# Patient Record
Sex: FEMALE | Race: OTHER | HISPANIC OR LATINO | URBAN - METROPOLITAN AREA
[De-identification: names, ages, dates, MRNs, and addresses within clinical notes are randomized per-mention and may not be internally consistent; named-entity substitution may affect disease eponyms.]

---

## 2024-11-01 ENCOUNTER — HOSPITAL ENCOUNTER (EMERGENCY)
Facility: HOSPITAL | Age: 24
Discharge: HOME | End: 2024-11-01
Attending: EMERGENCY MEDICINE
Payer: COMMERCIAL

## 2024-11-01 VITALS
WEIGHT: 135.38 LBS | DIASTOLIC BLOOD PRESSURE: 68 MMHG | BODY MASS INDEX: 26.58 KG/M2 | OXYGEN SATURATION: 100 % | TEMPERATURE: 98 F | RESPIRATION RATE: 16 BRPM | HEIGHT: 60 IN | SYSTOLIC BLOOD PRESSURE: 110 MMHG | HEART RATE: 86 BPM

## 2024-11-01 DIAGNOSIS — O20.9 VAGINAL BLEEDING AFFECTING EARLY PREGNANCY: Primary | ICD-10-CM

## 2024-11-01 LAB
ABO + RH BLD: NORMAL
ALBUMIN SERPL-MCNC: 4.3 G/DL (ref 3.3–5.5)
ALP SERPL-CCNC: 44 U/L (ref 42–141)
B-HCG UR QL: POSITIVE
BILIRUB SERPL-MCNC: 0.6 MG/DL (ref 0.2–1.6)
BILIRUBIN, POC UA: NEGATIVE
BLOOD, POC UA: ABNORMAL
BUN SERPL-MCNC: 12 MG/DL (ref 7–22)
CALCIUM SERPL-MCNC: 10.2 MG/DL (ref 8–10.3)
CHLORIDE SERPL-SCNC: 103 MMOL/L (ref 98–108)
CLARITY, UA: CLEAR
COLOR, UA: YELLOW
CREAT SERPL-MCNC: 0.5 MG/DL (ref 0.6–1.2)
CTP QC/QA: YES
GLUCOSE SERPL-MCNC: 85 MG/DL (ref 73–118)
GLUCOSE, POC UA: NEGATIVE
HCG INTACT+B SERPL-ACNC: 17 MIU/ML
HCT, POC: NORMAL
HGB, POC: NORMAL (ref 14–18)
KETONES, POC UA: ABNORMAL
LEUKOCYTE EST, POC UA: ABNORMAL
MCH, POC: NORMAL
MCHC, POC: NORMAL
MCV, POC: NORMAL
MPV, POC: NORMAL
NITRITE, POC UA: NEGATIVE
PH UR STRIP: 6 [PH] (ref 5–8)
POC ALT (SGPT): 21 U/L (ref 10–47)
POC AST (SGOT): 28 U/L (ref 11–38)
POC PLATELET COUNT: NORMAL
POC TCO2: 24 MMOL/L (ref 18–33)
POTASSIUM BLD-SCNC: 4.4 MMOL/L (ref 3.6–5.1)
PROTEIN, POC UA: ABNORMAL
PROTEIN, POC: 8.1 G/DL (ref 6.4–8.1)
RBC, POC: NORMAL
RDW, POC: NORMAL
SODIUM BLD-SCNC: 140 MMOL/L (ref 128–145)
SPECIFIC GRAVITY, POC UA: 1.02 (ref 1–1.03)
UROBILINOGEN, POC UA: 0.2 E.U./DL
WBC, POC: NORMAL

## 2024-11-01 PROCEDURE — 84702 CHORIONIC GONADOTROPIN TEST: CPT | Performed by: EMERGENCY MEDICINE

## 2024-11-01 PROCEDURE — 86900 BLOOD TYPING SEROLOGIC ABO: CPT | Performed by: EMERGENCY MEDICINE

## 2024-11-01 PROCEDURE — 81025 URINE PREGNANCY TEST: CPT | Mod: ER

## 2024-11-01 PROCEDURE — 81003 URINALYSIS AUTO W/O SCOPE: CPT | Mod: ER

## 2024-11-01 PROCEDURE — 80053 COMPREHEN METABOLIC PANEL: CPT | Mod: ER

## 2024-11-01 PROCEDURE — 99284 EMERGENCY DEPT VISIT MOD MDM: CPT | Mod: 25,ER

## 2024-11-01 PROCEDURE — 85025 COMPLETE CBC W/AUTO DIFF WBC: CPT | Mod: ER

## 2024-11-01 NOTE — DISCHARGE INSTRUCTIONS
Your pregnancy test is positive but your blood pregnancy hormones levels are very low. Nothing was seen on the ultrasound, It is very important that you go to the ER on Monday for repeat testing to see if you have a normal pregnancy or if you are having a miscarriage.

## 2024-11-01 NOTE — ED PROVIDER NOTES
Encounter Date: 11/1/2024    SCRIBE #1 NOTE: I, Philly Alvarenga, am scribing for, and in the presence of,  Ayde Ta MD. I have scribed the following portions of the note - Other sections scribed: HPI, ROS, PE.       History     Chief Complaint   Patient presents with    Vaginal Bleeding     Pt states vaginal bleeding and cramping x 2 days. Pt states she is pregnant per home test      24 year old female G1, with no PMHx, presents to the ED for evaluation of vaginal bleeding, symptoms began last night. Reports associated suprapubic tenderness. States three days ago she had a positive at home pregnancy test and is unsure how far along she is; her last menstrual period was 9/12/24. States she did not have sexual intercourse last night and has been wearing regular pads since last night. Not soaking them. States her first pregnancy was not in this country and that she does not have a local OB-GYN. No other alleviating or exacerbating factors. Denies any complaints at this time.  This is the extent of the patient's complaints in the ED.     The history is provided by the patient. The history is limited by a language barrier. A  was used ([id number]).     Review of patient's allergies indicates:   Allergen Reactions    Latex, natural rubber      History reviewed. No pertinent past medical history.  History reviewed. No pertinent surgical history.  No family history on file.  Social History     Tobacco Use    Smoking status: Unknown     Review of Systems   Constitutional:  Negative for activity change, appetite change, chills and fever.   HENT:  Negative for congestion, rhinorrhea, sneezing and sore throat.    Respiratory:  Negative for cough, choking, shortness of breath and wheezing.    Cardiovascular:  Negative for chest pain and palpitations.   Gastrointestinal:  Positive for abdominal pain (suprapubic). Negative for diarrhea, nausea and vomiting.   Genitourinary:  Positive for vaginal bleeding.    Neurological:  Negative for dizziness, syncope, light-headedness and headaches.   All other systems reviewed and are negative.      Physical Exam     Initial Vitals [11/01/24 1149]   BP Pulse Resp Temp SpO2   129/85 94 17 97.8 °F (36.6 °C) 100 %      MAP       --         Physical Exam    Nursing note and vitals reviewed.  Constitutional: She appears well-developed and well-nourished. No distress.   HENT:   Head: Normocephalic and atraumatic.   Eyes: Conjunctivae are normal.   Neck:   Normal range of motion.  Cardiovascular:  Normal rate, regular rhythm and normal heart sounds.           No murmur heard.  Pulmonary/Chest: Breath sounds normal. No respiratory distress.   Abdominal: Bowel sounds are normal. She exhibits no distension. There is abdominal tenderness in the suprapubic area.   Musculoskeletal:         General: Normal range of motion.      Cervical back: Normal range of motion.     Neurological: She is alert and oriented to person, place, and time.   Skin: Skin is warm and dry.   Psychiatric: She has a normal mood and affect. Her behavior is normal.         ED Course   Procedures  Labs Reviewed   POCT URINE PREGNANCY - Abnormal       Result Value    POC Preg Test, Ur Positive (*)      Acceptable Yes     POCT URINALYSIS W/O SCOPE - Abnormal    Glucose, UA Negative      Bilirubin, UA Negative      Ketones, UA 2+ (*)     Spec Grav UA 1.020      Blood, UA 3+ (*)     PH, UA 6.0      Protein, UA Trace (*)     Urobilinogen, UA 0.2      Nitrite, UA Negative      Leukocytes, UA 1+ (*)     Color, UA POC Yellow      Clarity, UA, POC Clear     POCT CMP - Abnormal    Albumin, POC 4.3      Alkaline Phosphatase, POC 44      ALT (SGPT), POC 21      AST (SGOT), POC 28      POC BUN 12      Calcium, POC 10.2      POC Chloride 103      POC Creatinine 0.5 (*)     POC Glucose 85      POC Potassium 4.4      POC Sodium 140      Bilirubin, POC 0.6      POC TCO2 24      Protein, POC 8.1     HCG, QUANTITATIVE    HCG  Quant 17      Narrative:     Release to patient->Immediate   POCT URINALYSIS W/O SCOPE   POCT CBC    Hematocrit        Hemoglobin        RBC        WBC        MCV        MCH, POC        MCHC        RDW-CV        Platelet Count, POC        MPV       GROUP & RH    Group & Rh A POS     POCT CMP          Imaging Results               US OB <14 Wks TransAbd & TransVag, Single Gestation (XPD) (Final result)  Result time 11/01/24 16:19:56      Final result by Isidoro Olivera MD (11/01/24 16:19:56)                   Impression:      No intrauterine pregnancy is detected.    The findings are nonspecific and may be associated with early imaging. Miscarriage or ectopic pregnancy cannot be excluded. Recommend OB gyn follow-up, follow-up transabdominal and transvaginal pelvic ultrasound and serial beta HCG.    This report was flagged in Epic as abnormal.      Electronically signed by: Isidoro Olivera  Date:    11/01/2024  Time:    16:19               Narrative:    EXAMINATION:  US OB <14 WEEKS, TRANSABDOM & TRANSVAG, SINGLE GESTATION (XPD)    CLINICAL HISTORY:  Vag Bleeding;    TECHNIQUE:  Transabdominal sonography of the pelvis was performed, followed by transvaginal sonography to better evaluate the uterus and ovaries.    COMPARISON:  None.    FINDINGS:  No intrauterine pregnancy identified.    Uterus measures 6.1 x 3.6 x 4.4 cm.    Endometrial stripe measures 1.2 cm.    Right ovary measures 1.3 x 2.2 x 1.2 cm.  Left ovary measures 2.42.6 x 1.5 cm.    Bilateral color and Doppler flow to the ovaries.    No free fluid in the cul-de-sac.    Cervix is closed measuring 2.7 cm.    The findings are nonspecific and may be associated with early imaging.  Miscarriage or ectopic pregnancy cannot be excluded.  Recommend OB gyn follow-up, follow-up transabdominal and transvaginal pelvic ultrasound and serial beta HCG.                                       Medications - No data to display  Medical Decision Making  24 year old female G1,  with no PMHx, presents to the ED for evaluation of vaginal bleeding, symptoms began last night. Reports associated suprapubic tenderness. States three day ago she had a positive at home pregnancy test and is unsure how far along she is; her last menstrual period was 9/12/24. States she did not have sexual intercourse last night and has been wearing regular pads since last night. States her first pregnancy was not in this country and that she does not have an OB-GYN. No other alleviating or exacerbating factors. Denies any complaints at this time.  This is the extent of the patient's complaints in the ED.    On exam, there is suprapubic tenderness.     In shared decision making with the patient I will order labs and imaging. Beat hcg is 17. No IUP seen on ultrasound. Differential includes normal early pregnancy vs miscarriage. Pt was advised to return to ER on Monday for repeat labs and possible ultrasound. She understands plan.     Amount and/or Complexity of Data Reviewed  Labs: ordered. Decision-making details documented in ED Course.  Radiology: ordered. Decision-making details documented in ED Course.            Scribe Attestation:   Scribe #1: I performed the above scribed service and the documentation accurately describes the services I performed. I attest to the accuracy of the note.                               Clinical Impression:  Final diagnoses:  [O20.9] Vaginal bleeding affecting early pregnancy (Primary)          ED Disposition Condition    Discharge Stable          I, Dr. Ayde Ta, personally performed the services described in this documentation.   All medical record entries made by the scribe were at my direction and in my presence.   I have reviewed the chart and agree that the record is accurate and complete.   Ayde Ta MD.  1:53 PM 11/01/2024   ED Prescriptions    None       Follow-up Information       Follow up With Specialties Details Why Contact Brando Pichardo, [facility]     [address]  [place] [address]  [phone]      [facility]   [address] [facility] [address]  [phone]             Ayde Ta MD  11/01/24 0322

## 2024-11-04 ENCOUNTER — HOSPITAL ENCOUNTER (EMERGENCY)
Facility: HOSPITAL | Age: 24
Discharge: HOME | End: 2024-11-04
Attending: EMERGENCY MEDICINE
Payer: COMMERCIAL

## 2024-11-04 VITALS
BODY MASS INDEX: 25.91 KG/M2 | HEIGHT: 60 IN | OXYGEN SATURATION: 99 % | RESPIRATION RATE: 18 BRPM | DIASTOLIC BLOOD PRESSURE: 82 MMHG | WEIGHT: 132 LBS | SYSTOLIC BLOOD PRESSURE: 125 MMHG | HEART RATE: 78 BPM | TEMPERATURE: 98 F

## 2024-11-04 DIAGNOSIS — N93.9 VAGINAL BLEEDING: Primary | ICD-10-CM

## 2024-11-04 DIAGNOSIS — Z34.90 PREGNANCY, UNSPECIFIED GESTATIONAL AGE: ICD-10-CM

## 2024-11-04 LAB
ABO GROUP BLD: NORMAL
ALBUMIN SERPL BCP-MCNC: 4.1 G/DL (ref 3.5–5.2)
ALP SERPL-CCNC: 46 U/L (ref 40–150)
ALT SERPL W/O P-5'-P-CCNC: 21 U/L (ref 10–44)
ANION GAP SERPL CALC-SCNC: 9 MMOL/L (ref 8–16)
AST SERPL-CCNC: 22 U/L (ref 10–40)
B-HCG UR QL: POSITIVE
BASOPHILS # BLD AUTO: 0.03 K/UL (ref 0–0.2)
BASOPHILS NFR BLD: 0.3 % (ref 0–1.9)
BILIRUB SERPL-MCNC: 0.2 MG/DL (ref 0.1–1)
BUN SERPL-MCNC: 9 MG/DL (ref 6–20)
CALCIUM SERPL-MCNC: 9.9 MG/DL (ref 8.7–10.5)
CHLORIDE SERPL-SCNC: 112 MMOL/L (ref 95–110)
CO2 SERPL-SCNC: 22 MMOL/L (ref 23–29)
CREAT SERPL-MCNC: 0.7 MG/DL (ref 0.5–1.4)
CTP QC/QA: YES
DIFFERENTIAL METHOD BLD: NORMAL
EOSINOPHIL # BLD AUTO: 0.1 K/UL (ref 0–0.5)
EOSINOPHIL NFR BLD: 1 % (ref 0–8)
ERYTHROCYTE [DISTWIDTH] IN BLOOD BY AUTOMATED COUNT: 12.6 % (ref 11.5–14.5)
EST. GFR  (NO RACE VARIABLE): >60 ML/MIN/1.73 M^2
GLUCOSE SERPL-MCNC: 87 MG/DL (ref 70–110)
HCG INTACT+B SERPL-ACNC: 96 MIU/ML
HCT VFR BLD AUTO: 41.6 % (ref 37–48.5)
HGB BLD-MCNC: 13.7 G/DL (ref 12–16)
IMM GRANULOCYTES # BLD AUTO: 0.03 K/UL (ref 0–0.04)
IMM GRANULOCYTES NFR BLD AUTO: 0.3 % (ref 0–0.5)
LYMPHOCYTES # BLD AUTO: 2.7 K/UL (ref 1–4.8)
LYMPHOCYTES NFR BLD: 30.5 % (ref 18–48)
MCH RBC QN AUTO: 29.5 PG (ref 27–31)
MCHC RBC AUTO-ENTMCNC: 32.9 G/DL (ref 32–36)
MCV RBC AUTO: 90 FL (ref 82–98)
MONOCYTES # BLD AUTO: 0.6 K/UL (ref 0.3–1)
MONOCYTES NFR BLD: 6.8 % (ref 4–15)
NEUTROPHILS # BLD AUTO: 5.4 K/UL (ref 1.8–7.7)
NEUTROPHILS NFR BLD: 61.1 % (ref 38–73)
NRBC BLD-RTO: 0 /100 WBC
PLATELET # BLD AUTO: 359 K/UL (ref 150–450)
PMV BLD AUTO: 9.5 FL (ref 9.2–12.9)
POTASSIUM SERPL-SCNC: 3.8 MMOL/L (ref 3.5–5.1)
PROT SERPL-MCNC: 8.1 G/DL (ref 6–8.4)
RBC # BLD AUTO: 4.65 M/UL (ref 4–5.4)
RH BLD: NORMAL
SODIUM SERPL-SCNC: 143 MMOL/L (ref 136–145)
WBC # BLD AUTO: 8.88 K/UL (ref 3.9–12.7)

## 2024-11-04 PROCEDURE — 80053 COMPREHEN METABOLIC PANEL: CPT

## 2024-11-04 PROCEDURE — 86900 BLOOD TYPING SEROLOGIC ABO: CPT

## 2024-11-04 PROCEDURE — 99284 EMERGENCY DEPT VISIT MOD MDM: CPT | Mod: 25

## 2024-11-04 PROCEDURE — 85025 COMPLETE CBC W/AUTO DIFF WBC: CPT

## 2024-11-04 PROCEDURE — 81025 URINE PREGNANCY TEST: CPT

## 2024-11-04 PROCEDURE — 84702 CHORIONIC GONADOTROPIN TEST: CPT

## 2024-11-04 RX ORDER — FAMOTIDINE 20 MG
1 TABLET ORAL DAILY
Qty: 30 TABLET | Refills: 0 | Status: SHIPPED | OUTPATIENT
Start: 2024-11-04 | End: 2025-11-04

## 2024-11-04 NOTE — ED PROVIDER NOTES
Encounter Date: 11/4/2024       History     Chief Complaint   Patient presents with    Vaginal Bleeding     Patient reports vag bleeding since Thursday of last week. Has not established care with OB yet. Has US last week showing no IUP. Reports mild to moderate bleeding with small clots.     Abdominal Cramping     Female with no PMHx presents to emergency department for evaluation of vaginal bleeding x5 days.  She has previously seen at [facility] for same complaint.  Reports she did have a home pregnancy test that was positive prior to the [place] ER visit.  Reports her last menstrual period was 9/12/24.  She reports the bleeding is intermittent and is only really noticeable when she is going to the bathroom.  She states she is not bleeding through a full pad.  She has not established with an OBGYN.  She had blood work as well as a transvaginal ultrasound while at the ER and was instructed to return to emergency department on Monday for repeat labs and possible ultrasound.     was used #[id number]        Review of patient's allergies indicates:   Allergen Reactions    Latex, natural rubber      History reviewed. No pertinent past medical history.  History reviewed. No pertinent surgical history.  No family history on file.  Social History     Tobacco Use    Smoking status: Unknown     Review of Systems   Constitutional:  Negative for chills and fever.   HENT:  Negative for sore throat.    Respiratory:  Negative for shortness of breath.    Cardiovascular:  Negative for chest pain.   Gastrointestinal:  Positive for abdominal pain (Suprapubic) and nausea. Negative for diarrhea and vomiting.   Genitourinary:  Positive for vaginal bleeding. Negative for dysuria, flank pain, frequency, vaginal discharge and vaginal pain.   Musculoskeletal:  Negative for back pain.   Skin:  Negative for rash.   Neurological:  Negative for dizziness, syncope, weakness and light-headedness.   Hematological:  Does not  bruise/bleed easily.       Physical Exam     Initial Vitals [11/04/24 1024]   BP Pulse Resp Temp SpO2   122/79 76 18 98.1 °F (36.7 °C) 99 %      MAP       --         Physical Exam    Vitals reviewed.  Constitutional: She appears well-developed and well-nourished. No distress.   Neck: Neck supple.   Normal range of motion.  Cardiovascular:  Normal rate and regular rhythm.     Exam reveals no friction rub.       No murmur heard.  Pulmonary/Chest: Breath sounds normal. No respiratory distress. She has no wheezes.   Abdominal: Abdomen is soft and flat. Bowel sounds are normal. She exhibits no distension. There is abdominal tenderness in the suprapubic area.   No right CVA tenderness.  No left CVA tenderness. There is no rebound, no guarding, no tenderness at McBurney's point and negative Blair's sign. negative Rovsing's sign  Genitourinary:    Vagina normal.      Pelvic exam was performed with patient prone.   Cervix exhibits no friability.    No vaginal discharge or tenderness.   No tenderness in the vagina.    No signs of injury in the vagina.      Genitourinary Comments: Chaperone, [place] Carroll, RN, was present at time of pelvic exam    On vaginal exam old brown blood in in vaginal canal.  Cervical os closed.  No cervical friability.     Musculoskeletal:      Cervical back: Normal range of motion and neck supple.     Neurological: She is alert and oriented to person, place, and time.   Skin: Skin is warm.         ED Course   Procedures  Labs Reviewed   COMPREHENSIVE METABOLIC PANEL - Abnormal       Result Value    Sodium 143      Potassium 3.8      Chloride 112 (*)     CO2 22 (*)     Glucose 87      BUN 9      Creatinine 0.7      Calcium 9.9      Total Protein 8.1      Albumin 4.1      Total Bilirubin 0.2      Alkaline Phosphatase 46      AST 22      ALT 21      eGFR >60      Anion Gap 9      Narrative:     Release to patient->Immediate   POCT URINE PREGNANCY - Abnormal    POC Preg Test, Ur Positive (*)       Acceptable Yes     CBC W/ AUTO DIFFERENTIAL    WBC 8.88      RBC 4.65      Hemoglobin 13.7      Hematocrit 41.6      MCV 90      MCH 29.5      MCHC 32.9      RDW 12.6      Platelets 359      MPV 9.5      Immature Granulocytes 0.3      Gran # (ANC) 5.4      Immature Grans (Abs) 0.03      Lymph # 2.7      Mono # 0.6      Eos # 0.1      Baso # 0.03      nRBC 0      Gran % 61.1      Lymph % 30.5      Mono % 6.8      Eosinophil % 1.0      Basophil % 0.3      Differential Method Automated      Narrative:     Release to patient->Immediate   HCG, QUANTITATIVE    HCG Quant 96      Narrative:     Release to patient->Immediate   GROUP & RH    ABO A      Rh Type POS            Imaging Results              US OB <14 Wks TransAbd & TransVag, Single Gestation (XPD) (Final result)  Result time 11/04/24 12:42:52      Final result by Nomi Reynoso MD (11/04/24 12:42:52)                   Impression:      No IUP or extra uterine gestational sac seen consistent with pregnancy of unknown location.  Follow-up with serial beta HCG and/or pelvic ultrasound recommended to diagnosis confirmation.      Electronically signed by: Nomi Reynoso MD  Date:    11/04/2024  Time:    12:42               Narrative:    EXAMINATION:  US OB <14 WEEKS, TRANSABDOM & TRANSVAG, SINGLE GESTATION (XPD)    CLINICAL HISTORY:  Vag Bleeding;    TECHNIQUE:  Transabdominal sonography of the pelvis was performed, followed by transvaginal sonography to better evaluate the uterus and ovaries.    COMPARISON:  Pelvic ultrasound 11/01/2024    FINDINGS:  Uterus: 7.5 x 4.1 x 6.1 cm.  No discrete fibroid.  Endometrium measures 9 mm in thickness.    No IUP or extra uterine gestational sac seen.    Right ovary: 1.6 x 1.4 x 1.9 cm with intact arterial and venous flow    Left ovary: 2.2 x 1.5 x 2.7 cm with intact arterial and venous flow containing a 1.5 cm probable corpus luteum which appears new from 11/01/2024 pelvic ultrasound    Miscellaneous: No Free  Fluid.                                       Medications - No data to display  Medical Decision Making  [age] old female no PMHx presents to emergency department with vaginal bleeding abdominal pain.    Differential diagnosis includes but not limited to implantation pregnancy bleeding, ectopic pregnancy, missed , threatened , septic  and others     CBC and CMP were within normal limits, beta hCG was 96, previous beta hCG on  was 17.  Repeated transvaginal ultrasound which showed no IUP or extra uterine gestational sac.  This is similar to her previous ultrasound.  Results could be due to earliness of pregnancy.  On vaginal exam chaperone was present, [place] BLACKMON.  Vaginal canal was normal appearing.  There was a very small amount old brown blood present on exam.  Cervical os was closed.  Bleeding could likely be due to implantation.  Due to the increasing beta HCG feels less likely we are having a missed, threatened, or septic .  Septic  is also on differential due to patient not having any fever like symptoms.  Instructed patient to follow up with OBGYN for continued serial beta hCG testing.  It was not ambulatory to referral to OBGYN as well as attached Dr. Barrientos's office number for her to reach out and make an appointment.  Patient states she is not currently on prenatal vitamins we will send some into her pharmacy.  Gave her return precautions such as increasing or worsening abdominal pain and bleeding, fever, N/V/D.    Discharge instructions were given using a  #[id number]    Risk  OTC drugs.                                      Clinical Impression:  Final diagnoses:  [N93.9] Vaginal bleeding (Primary)  [Z34.90] Pregnancy, unspecified gestational age          ED Disposition Condition    Discharge Stable          ED Prescriptions       Medication Sig Dispense Start Date End Date Auth. Provider    prenatal 21-iron fu-folic acid (PRENATAL COMPLETE) 14  mg iron- 400 mcg Tab Take 1 tablet by mouth once daily. 30 tablet 11/4/2024 11/4/2025 Heather Waite PA-C          Follow-up Information       Follow up With Specialties Details Why Contact Info    Josh Barrientos MD Obstetrics and Gynecology, Obstetrics and Gynecology Schedule an appointment as soon as possible for a visit   [address]  [address] [place] [id number]  [phone]               Heather Waite PA-C  11/04/24 1437       Heather Waite PA-C  11/04/24 1432

## 2024-11-04 NOTE — Clinical Note
"Sejal "Sejal"Konstantin Buitrago was seen and treated in our emergency department on 11/4/2024.  She may return to work on 11/06/2024.       If you have any questions or concerns, please don't hesitate to call.      Heather Waite PA-C"

## 2024-11-04 NOTE — Clinical Note
verified by Satish Christensen. Mother has been notified the office lab hours and [facility] hours. "Sejal "Sejal"Konstantin Buitrago was seen and treated in our emergency department on 11/4/2024.  She may return to work on 11/06/2024.       If you have any questions or concerns, please don't hesitate to call.      Heather Waite PA-C"

## 2024-11-04 NOTE — DISCHARGE INSTRUCTIONS
Más arriba he adjuntado un documento con el ginecólogo para que puedas hacer un seguimiento. Llama a ron consultorio para programar bharat fatoumata y que te repitan los análisis de laboratorio de seguimiento. Vuelve al servicio de urgencias si el sangrado empeora, tienes dolor abdominal, fiebre, náuseas o vómitos.

## 2025-02-01 ENCOUNTER — HOSPITAL ENCOUNTER (EMERGENCY)
Facility: OTHER | Age: 25
Discharge: HOME | End: 2025-02-01
Attending: OBSTETRICS & GYNECOLOGY
Payer: MEDICAID

## 2025-02-01 VITALS
RESPIRATION RATE: 18 BRPM | OXYGEN SATURATION: 100 % | TEMPERATURE: 98 F | HEART RATE: 76 BPM | DIASTOLIC BLOOD PRESSURE: 63 MMHG | SYSTOLIC BLOOD PRESSURE: 116 MMHG

## 2025-02-01 DIAGNOSIS — O26.899 URINARY URGENCY DURING PREGNANCY: ICD-10-CM

## 2025-02-01 DIAGNOSIS — Z3A.16 16 WEEKS GESTATION OF PREGNANCY: ICD-10-CM

## 2025-02-01 DIAGNOSIS — R51.9 NONINTRACTABLE HEADACHE, UNSPECIFIED CHRONICITY PATTERN, UNSPECIFIED HEADACHE TYPE: ICD-10-CM

## 2025-02-01 DIAGNOSIS — R39.15 URINARY URGENCY DURING PREGNANCY: ICD-10-CM

## 2025-02-01 DIAGNOSIS — R11.10 VOMITING, UNSPECIFIED VOMITING TYPE, UNSPECIFIED WHETHER NAUSEA PRESENT: Primary | ICD-10-CM

## 2025-02-01 LAB
BILIRUB UR QL STRIP: NEGATIVE
BILIRUBIN, POC UA: NEGATIVE
BLOOD, POC UA: NEGATIVE
CLARITY UR: CLEAR
CLARITY, UA: CLEAR
COLOR UR: YELLOW
COLOR, UA: YELLOW
GLUCOSE UR QL STRIP: NEGATIVE
GLUCOSE, POC UA: NEGATIVE
HGB UR QL STRIP: NEGATIVE
INFLUENZA A, MOLECULAR: NEGATIVE
INFLUENZA B, MOLECULAR: NEGATIVE
KETONES UR QL STRIP: ABNORMAL
KETONES, POC UA: >=160 MG/DL
LEUKOCYTE EST, POC UA: ABNORMAL
LEUKOCYTE ESTERASE UR QL STRIP: NEGATIVE
NITRITE UR QL STRIP: NEGATIVE
NITRITE, POC UA: NEGATIVE
PH UR STRIP: 6.5 [PH] (ref 5–8)
PH UR STRIP: 7 [PH] (ref 5–8)
PROT UR QL STRIP: NEGATIVE
PROTEIN, POC UA: NEGATIVE
SP GR UR STRIP: 1.01 (ref 1–1.03)
SPECIFIC GRAVITY, POC UA: 1.01 (ref 1–1.03)
SPECIMEN SOURCE: NORMAL
URN SPEC COLLECT METH UR: ABNORMAL
UROBILINOGEN UR STRIP-ACNC: NEGATIVE EU/DL
UROBILINOGEN, POC UA: 0.2 E.U./DL

## 2025-02-01 PROCEDURE — 59025 FETAL NON-STRESS TEST: CPT

## 2025-02-01 PROCEDURE — 99284 EMERGENCY DEPT VISIT MOD MDM: CPT | Mod: 25,,, | Performed by: OBSTETRICS & GYNECOLOGY

## 2025-02-01 PROCEDURE — 96361 HYDRATE IV INFUSION ADD-ON: CPT

## 2025-02-01 PROCEDURE — 87502 INFLUENZA DNA AMP PROBE: CPT

## 2025-02-01 PROCEDURE — 25000003 PHARM REV CODE 250

## 2025-02-01 PROCEDURE — 63600175 PHARM REV CODE 636 W HCPCS

## 2025-02-01 PROCEDURE — 76815 OB US LIMITED FETUS(S): CPT | Mod: 26,,, | Performed by: OBSTETRICS & GYNECOLOGY

## 2025-02-01 PROCEDURE — 99284 EMERGENCY DEPT VISIT MOD MDM: CPT

## 2025-02-01 PROCEDURE — 81003 URINALYSIS AUTO W/O SCOPE: CPT

## 2025-02-01 PROCEDURE — 96374 THER/PROPH/DIAG INJ IV PUSH: CPT

## 2025-02-01 RX ORDER — ONDANSETRON 4 MG/1
4 TABLET, FILM COATED ORAL EVERY 6 HOURS
Qty: 12 TABLET | Refills: 0 | Status: SHIPPED | OUTPATIENT
Start: 2025-02-01

## 2025-02-01 RX ORDER — ACETAMINOPHEN 500 MG
1000 TABLET ORAL
Status: COMPLETED | OUTPATIENT
Start: 2025-02-01 | End: 2025-02-01

## 2025-02-01 RX ORDER — ONDANSETRON HYDROCHLORIDE 2 MG/ML
4 INJECTION, SOLUTION INTRAVENOUS
Status: COMPLETED | OUTPATIENT
Start: 2025-02-01 | End: 2025-02-01

## 2025-02-01 RX ADMIN — ONDANSETRON 4 MG: 2 INJECTION INTRAMUSCULAR; INTRAVENOUS at 06:02

## 2025-02-01 RX ADMIN — ACETAMINOPHEN 1000 MG: 500 TABLET, FILM COATED ORAL at 06:02

## 2025-02-01 RX ADMIN — SODIUM CHLORIDE, POTASSIUM CHLORIDE, SODIUM LACTATE AND CALCIUM CHLORIDE 1000 ML: 600; 310; 30; 20 INJECTION, SOLUTION INTRAVENOUS at 06:02

## 2025-02-02 NOTE — DISCHARGE INSTRUCTIONS
Estamos realizando más pruebas en ron orina y lo llamaremos si son positivas. Si eso sucede, hablaremos con usted sobre el envío de un antibiótico. Por favor jean carlos muchos líquidos en casa con agua, Gatoraid/Poweraid, té, caldos. Hemos enviado un medicamento a ron farmacia que debería ayudar con los vómitos. Tómelo según las indicaciones para los síntomas.

## 2025-02-02 NOTE — ED PROVIDER NOTES
Encounter Date: 2/1/2025       History     Chief Complaint   Patient presents with    Abdominal Pain     Lower abdominal pain with nausea and vomiting that started four hours ago no vaginal bleeding.     The history is limited by a language barrier. A  was used.     Sejal Buitrago is a 24 y.o. female, G1 currently 16w4d (per dating scan at 6wks) presenting with complaints of vomiting all day and associated headache. Cannot hold down liquids and reports this feels different from her normal nausea with pregnacy. Pt reports urinary urgency without dysuria. Takes no other medications besides Diclegis for N/V. Constant HA all day that is bitemporal. Denies taking medications prior to arrival.     She has not felt fetal movement yet in this pregnancy. She has previous pregnacy with uncomplicated vaginal delivery. Denies other significant PMH. Denies vaginal bleeding or abdominal pain.    Review of patient's allergies indicates:   Allergen Reactions    Latex, natural rubber      No past medical history on file.  No past surgical history on file.  No family history on file.  Social History     Tobacco Use    Smoking status: Unknown     Review of Systems   Constitutional:  Negative for chills and fever.   Respiratory:  Negative for cough, chest tightness and shortness of breath.    Cardiovascular:  Negative for chest pain.   Gastrointestinal:  Positive for nausea and vomiting. Negative for constipation and diarrhea.   Genitourinary:  Negative for vaginal bleeding.   Neurological:  Positive for headaches. Negative for dizziness and light-headedness.       Physical Exam     Initial Vitals   BP Pulse Resp Temp SpO2   02/01/25 1740 02/01/25 1800 02/01/25 1740 02/01/25 1800 02/01/25 1740   127/74 89 18 97.8 °F (36.6 °C) 100 %      MAP       --                Physical Exam    Nursing note and vitals reviewed.  Constitutional: She appears well-developed and well-nourished. She is not diaphoretic. She is  active and cooperative.  Non-toxic appearance. She does not appear ill. No distress.   HENT:   Head: Normocephalic.   Eyes: Conjunctivae and EOM are normal. No scleral icterus.   Cardiovascular:  Normal rate, regular rhythm and intact distal pulses.  No extrasystoles are present.    Exam reveals no gallop, no S3, no S4, no distant heart sounds and no friction rub.       No murmur heard.  Pulmonary/Chest: Breath sounds normal. No respiratory distress. She has no wheezes. She has no rhonchi. She has no rales.   Abdominal: Abdomen is soft. She exhibits no distension. There is no abdominal tenderness. There is no rebound and no guarding.   Musculoskeletal:         General: Normal range of motion.     Neurological: She is alert. She is not disoriented. GCS score is 15. GCS eye subscore is 4. GCS verbal subscore is 5. GCS motor subscore is 6.   Skin: Skin is warm and dry.   Psychiatric: She has a normal mood and affect.         ED Course   Fetal non-stress test    Date/Time: 2/1/2025 7:06 PM    Performed by: Alondra Covarrubias MD  Authorized by: Anna Hernandez MD    Nonstress Test:     Baseline:  140    Labs Reviewed   URINALYSIS, REFLEX TO URINE CULTURE - Abnormal       Result Value    Specimen UA Urine, Clean Catch      Color, UA Yellow      Appearance, UA Clear      pH, UA 7.0      Specific Gravity, UA 1.010      Protein, UA Negative      Glucose, UA Negative      Ketones, UA 2+ (*)     Bilirubin (UA) Negative      Occult Blood UA Negative      Nitrite, UA Negative      Urobilinogen, UA Negative      Leukocytes, UA Negative      Narrative:     Specimen Source->Urine   POCT URINALYSIS W/O SCOPE - Abnormal    Spec Grav UA 1.010      PH, UA 6.5      Protein, UA Negative      Glucose, UA Negative      Ketones, UA >=160 (*)     Bilirubin, UA Negative      Blood, UA Negative      Leukocytes, UA Trace (*)     Nitrite, UA Negative      Urobilinogen, UA 0.2      Color, UA POC Yellow      Clarity, UA, POC Clear     INFLUENZA  A & B BY MOLECULAR    Influenza A, Molecular Negative      Influenza B, Molecular Negative      Flu A & B Source Nasal Swab            Imaging Results    None          Medications   ondansetron injection 4 mg (4 mg Intravenous Given 2/1/25 1843)   acetaminophen tablet 1,000 mg (1,000 mg Oral Given 2/1/25 1843)   lactated ringers bolus 1,000 mL (0 mLs Intravenous Stopped 2/1/25 1940)     Medical Decision Making  Sejal Buitrago  is a 24 y.o. G1 at 16w4d presenting with nausea/vomiting with inability to tolerate PO, headache, and urinary urgency.     FHR on doppler 159, 144 on bedside US. US also showed good fetal movement. Pt found to be Influenza negative today. She was given 1L LR for fluid rehydration. Pt given tylenol for HA and Zofran for vomiting. Pt reports improvement in headache and nausea/vomiting and passed PO challenge. Discharge home.     Amount and/or Complexity of Data Reviewed  Labs:  Decision-making details documented in ED Course.    Risk  OTC drugs.  Prescription drug management.              Attending Attestation:   Physician Attestation Statement for Resident:  As the supervising MD   Physician Attestation Statement: I have personally seen and examined this patient.   I agree with the above history.  -:   As the supervising MD I agree with the above PE.     As the supervising MD I agree with the above treatment, course, plan, and disposition.   I was personally present during the critical portions of the procedure(s) performed by the resident and was immediately available in the ED to provide services and assistance as needed during the entire procedure.  I have reviewed and agree with the residents interpretation of the following: lab data.  I have reviewed the following: old records at this facility.            Attending ED Notes:   I saw and examined the patient myself along with the resident. I have personally reviewed pertinent prior records, labs, imaging, and procedures. I have  reviewed the documentation and agree with the findings and plan as documented.      at 16w4d presenting with nausea/vomiting with inability to tolerate PO, headache, and urinary frequency. UA with large ketones, no signs of infection. Flu negative. Symptoms improved with Tylenol and Zofran, able to tolerate PO challenge. FHT appropriate for gestational age. Rx sent for Zofran, counseled regarding supportive care measures and return precautions. Discharge home in stable condition.    Anna Hernandez MD FACOG  OB Hospitalist          ED Course as of 25 1342   Sat  BP: 127/74 [HB]    Temp: 97.8 °F (36.6 °C) [HB]    Pulse: 89 [HB]    SpO2: 100 % [HB]    BP: 120/61 [HB]    Pulse: 93 [HB]    BP: 120/61 [HB]    Influenza A, Molecular: Negative [HB]    Influenza B, Molecular: Negative [HB]    Leukocytes, UA(!): Trace [HB]      ED Course User Index  [HB] Alondra Covarrubias MD                           Clinical Impression:  Final diagnoses:  [R11.10] Vomiting, unspecified vomiting type, unspecified whether nausea present (Primary)  [R51.9] Nonintractable headache, unspecified chronicity pattern, unspecified headache type  [O26.899, R39.15] Urinary urgency during pregnancy  [Z3A.16] 16 weeks gestation of pregnancy          ED Disposition Condition    Discharge Stable          ED Prescriptions       Medication Sig Dispense Start Date End Date Auth. Provider    ondansetron (ZOFRAN) 4 MG tablet Take 1 tablet (4 mg total) by mouth every 6 (six) hours. 12 tablet 2025 -- Alondra Covarrubias MD          Follow-up Information       Follow up With Specialties Details Why Contact Info    [facility] - [facility] Dept (Obstetrics) Emergency Medicine  If symptoms worsen [address] [facility] [id number] [phone]    Ruth Ann Yap MD Obstetrics and Gynecology  As needed, As scheduled [address] [facility]  [address]  [phone]               Alondra Covarrubias MD  Resident  02/01/25 2063       Anna Hernandez MD  02/07/25 2816